# Patient Record
Sex: FEMALE | Race: OTHER | NOT HISPANIC OR LATINO | URBAN - METROPOLITAN AREA
[De-identification: names, ages, dates, MRNs, and addresses within clinical notes are randomized per-mention and may not be internally consistent; named-entity substitution may affect disease eponyms.]

---

## 2018-09-17 ENCOUNTER — EMERGENCY (EMERGENCY)
Facility: HOSPITAL | Age: 38
LOS: 1 days | Discharge: HOME | End: 2018-09-17
Attending: EMERGENCY MEDICINE
Payer: SELF-PAY

## 2018-09-17 ENCOUNTER — OUTPATIENT (OUTPATIENT)
Dept: OUTPATIENT SERVICES | Facility: HOSPITAL | Age: 38
LOS: 1 days | End: 2018-09-17
Payer: SELF-PAY

## 2018-09-17 VITALS
RESPIRATION RATE: 18 BRPM | HEART RATE: 94 BPM | DIASTOLIC BLOOD PRESSURE: 66 MMHG | SYSTOLIC BLOOD PRESSURE: 102 MMHG | OXYGEN SATURATION: 100 % | TEMPERATURE: 97 F

## 2018-09-17 VITALS — SYSTOLIC BLOOD PRESSURE: 107 MMHG | HEART RATE: 79 BPM | DIASTOLIC BLOOD PRESSURE: 70 MMHG

## 2018-09-17 VITALS — WEIGHT: 132.94 LBS

## 2018-09-17 DIAGNOSIS — O47.02 FALSE LABOR BEFORE 37 COMPLETED WEEKS OF GESTATION, SECOND TRIMESTER: ICD-10-CM

## 2018-09-17 LAB
APPEARANCE UR: CLEAR — SIGNIFICANT CHANGE UP
BILIRUB UR-MCNC: NEGATIVE — SIGNIFICANT CHANGE UP
COLOR SPEC: YELLOW — SIGNIFICANT CHANGE UP
DIFF PNL FLD: NEGATIVE — SIGNIFICANT CHANGE UP
EPI CELLS # UR: SIGNIFICANT CHANGE UP
GLUCOSE UR QL: NEGATIVE MG/DL — SIGNIFICANT CHANGE UP
HCG UR QL: POSITIVE
KETONES UR-MCNC: NEGATIVE — SIGNIFICANT CHANGE UP
LEUKOCYTE ESTERASE UR-ACNC: ABNORMAL
NITRITE UR-MCNC: NEGATIVE — SIGNIFICANT CHANGE UP
PH UR: 8 — SIGNIFICANT CHANGE UP (ref 5–8)
PROT UR-MCNC: 15 MG/DL
SP GR SPEC: 1.01 — SIGNIFICANT CHANGE UP (ref 1.01–1.02)
UROBILINOGEN FLD QL: 1 MG/DL
WBC UR QL: SIGNIFICANT CHANGE UP

## 2018-09-17 PROCEDURE — 99244 OFF/OP CNSLTJ NEW/EST MOD 40: CPT

## 2018-09-17 PROCEDURE — G0463: CPT

## 2018-09-17 PROCEDURE — 99284 EMERGENCY DEPT VISIT MOD MDM: CPT

## 2018-09-17 PROCEDURE — 76805 OB US >/= 14 WKS SNGL FETUS: CPT

## 2018-09-17 PROCEDURE — 81001 URINALYSIS AUTO W/SCOPE: CPT

## 2018-09-17 PROCEDURE — 59025 FETAL NON-STRESS TEST: CPT

## 2018-09-17 PROCEDURE — 99284 EMERGENCY DEPT VISIT MOD MDM: CPT | Mod: 25

## 2018-09-17 PROCEDURE — 76805 OB US >/= 14 WKS SNGL FETUS: CPT | Mod: 26

## 2018-09-17 PROCEDURE — 81025 URINE PREGNANCY TEST: CPT

## 2018-09-17 RX ORDER — SODIUM CHLORIDE 9 MG/ML
1000 INJECTION INTRAMUSCULAR; INTRAVENOUS; SUBCUTANEOUS ONCE
Qty: 0 | Refills: 0 | Status: DISCONTINUED | OUTPATIENT
Start: 2018-09-17 | End: 2018-09-17

## 2018-09-17 NOTE — ED ADULT NURSE REASSESSMENT NOTE - NS ED NURSE REASSESS COMMENT FT1
per pa pt 5months pregnant to go directly to [facility], pt presently in [facility], [facility] notified for pt transport to [facility], [facility]n made aware by pa

## 2018-09-17 NOTE — ED ADULT TRIAGE NOTE - CHIEF COMPLAINT QUOTE
Pt brought in by family for nausea and headache. Pt states she is 5 months pregnant but has not see an OB/GYN .

## 2018-09-17 NOTE — ED STATDOCS - OBJECTIVE STATEMENT
39 y/o F pt 5 months pregnant presents to the ED c/o sudden onset nausea and a headache a few days ago.  She ate breakfast this morning and was able to hold the food down without vomiting.  She has not followed up with a OB/GYN yet.  Pt is from [facility], and does not speak english.  Hx obtained with the help of her mother.  Denies fever, chills, vomiting, abdominal pain, urinary symptoms, vaginal bleeding.  No further acute complaints at this time.

## 2018-09-17 NOTE — ED ADULT NURSE NOTE - NSIMPLEMENTINTERV_GEN_ALL_ED
Implemented All Universal Safety Interventions:  [place] to call system. Call bell, personal items and telephone within reach. Instruct patient to call for assistance. Room bathroom lighting operational. Non-slip footwear when patient is off stretcher. Physically safe environment: no spills, clutter or unnecessary equipment. Stretcher in lowest position, wheels locked, appropriate side rails in place.

## 2018-09-17 NOTE — ED STATDOCS - PROGRESS NOTE DETAILS
PT evaluated by intake physician. HPI/PE/ROS as noted above. Will follow up plan per intake physician. Pt urine reviewed. PT to be transferred to L&D for evaluation. Pt urine reviewed. Spoke with Jocelin Taylor PBGYN resident. Awaiting Taulia states Pt is 22 weeks, will transfer to L&D.

## 2018-09-17 NOTE — ED STATDOCS - ATTENDING CONTRIBUTION TO CARE
I, Kimo Malagon, performed the initial face to face bedside interview with this patient regarding history of present illness, review of symptoms and relevant past medical, social and family history.  I completed an independent physical examination.  I was the initial provider who evaluated this patient. I have signed out the follow up of any pending tests (i.e. labs, radiological studies) to the ACP.  I have communicated the patient’s plan of care and disposition with the ACP.  The history, relevant review of systems, past medical and surgical history, medical decision making, and physical examination was documented by the scribe in my presence and I attest to the accuracy of the documentation.

## 2018-09-19 PROBLEM — Z00.00 ENCOUNTER FOR PREVENTIVE HEALTH EXAMINATION: Status: ACTIVE | Noted: 2018-09-19

## 2018-09-21 ENCOUNTER — APPOINTMENT (OUTPATIENT)
Dept: OBGYN | Facility: CLINIC | Age: 38
End: 2018-09-21

## 2018-11-29 ENCOUNTER — OUTPATIENT (OUTPATIENT)
Dept: INPATIENT UNIT | Facility: HOSPITAL | Age: 38
LOS: 1 days | End: 2018-11-29
Payer: MEDICAID

## 2018-11-29 VITALS — HEART RATE: 99 BPM | SYSTOLIC BLOOD PRESSURE: 119 MMHG | DIASTOLIC BLOOD PRESSURE: 79 MMHG

## 2018-11-29 DIAGNOSIS — O47.03 FALSE LABOR BEFORE 37 COMPLETED WEEKS OF GESTATION, THIRD TRIMESTER: ICD-10-CM

## 2018-11-29 PROCEDURE — 99213 OFFICE O/P EST LOW 20 MIN: CPT | Mod: GC

## 2018-11-29 PROCEDURE — 76818 FETAL BIOPHYS PROFILE W/NST: CPT | Mod: 26,GC

## 2018-11-29 PROCEDURE — G0463: CPT

## 2018-11-29 PROCEDURE — 76815 OB US LIMITED FETUS(S): CPT

## 2018-11-29 PROCEDURE — 59025 FETAL NON-STRESS TEST: CPT

## 2018-11-29 NOTE — OB PROVIDER TRIAGE NOTE - NSOBPROVIDERNOTE_OBGYN_ALL_OB_FT
has 10/10 bedside BPP supervised by Dr. Carreno. Pt given information for [facility] clinic since she stated that she plans on delivering in the [place] and they need to establish care in this country.     J Luis has 10/10 bedside BPP supervised by Dr. Carreno. Pt given information for [facility] clinic since she stated that she plans on delivering in the  and they need to establish care in this country.     J Luis    OB/GYN hospitalistL  Patient seen with PGY-2 for complaints of decreased FM. Since arrival on L&D, patient reports +FM.  Patient has received prenatal care in [facility], and is planning to establish care in [facility].  NST/BPP 10/10, DELIA 15  Fetal kick counts reviewed with patient.  Gretchen Carreno MD

## 2018-11-29 NOTE — OB PROVIDER TRIAGE NOTE - HISTORY OF PRESENT ILLNESS
37 yo  here at 31w 2 d  for decreased fetal movement. She reports that she normally feels baby moving regularly. She denies ctx and LOF.      She has received prenatal care in [facility] and brought her prenatal records with her     145 mod keith/ + accels/ no decels

## 2018-11-29 NOTE — OB RN TRIAGE NOTE - FAMILY HISTORY
Family history of colon cancer in father     Father  Still living? No  Family history of hypertension in father, Age at diagnosis: Age Unknown     Mother  Still living? Yes, Estimated age: Age Unknown  Family history of hypertension in mother, Age at diagnosis: Age Unknown

## 2019-01-10 ENCOUNTER — OUTPATIENT (OUTPATIENT)
Dept: OUTPATIENT SERVICES | Facility: HOSPITAL | Age: 39
LOS: 1 days | End: 2019-01-10
Payer: MEDICAID

## 2019-01-10 VITALS
HEART RATE: 76 BPM | RESPIRATION RATE: 18 BRPM | DIASTOLIC BLOOD PRESSURE: 62 MMHG | SYSTOLIC BLOOD PRESSURE: 110 MMHG | TEMPERATURE: 98 F

## 2019-01-10 VITALS — WEIGHT: 108.03 LBS | HEIGHT: 63 IN

## 2019-01-10 DIAGNOSIS — Z01.818 ENCOUNTER FOR OTHER PREPROCEDURAL EXAMINATION: ICD-10-CM

## 2019-01-10 DIAGNOSIS — N89.8 OTHER SPECIFIED NONINFLAMMATORY DISORDERS OF VAGINA: Chronic | ICD-10-CM

## 2019-01-10 LAB
BASOPHILS # BLD AUTO: 0 K/UL — SIGNIFICANT CHANGE UP (ref 0–0.2)
BASOPHILS NFR BLD AUTO: 0.2 % — SIGNIFICANT CHANGE UP (ref 0–2)
BLD GP AB SCN SERPL QL: SIGNIFICANT CHANGE UP
DACRYOCYTES BLD QL SMEAR: SLIGHT — SIGNIFICANT CHANGE UP
EOSINOPHIL # BLD AUTO: 0 K/UL — SIGNIFICANT CHANGE UP (ref 0–0.5)
EOSINOPHIL NFR BLD AUTO: 0.9 % — SIGNIFICANT CHANGE UP (ref 0–6)
HCT VFR BLD CALC: 33.5 % — LOW (ref 37–47)
HGB BLD-MCNC: 10.2 G/DL — LOW (ref 12–16)
HYPOCHROMIA BLD QL: SLIGHT — SIGNIFICANT CHANGE UP
LYMPHOCYTES # BLD AUTO: 1.4 K/UL — SIGNIFICANT CHANGE UP (ref 1–4.8)
LYMPHOCYTES # BLD AUTO: 24.1 % — SIGNIFICANT CHANGE UP (ref 20–55)
MCHC RBC-ENTMCNC: 21.9 PG — LOW (ref 27–31)
MCHC RBC-ENTMCNC: 30.4 G/DL — LOW (ref 32–36)
MCV RBC AUTO: 71.9 FL — LOW (ref 81–99)
MICROCYTES BLD QL: SLIGHT — SIGNIFICANT CHANGE UP
MONOCYTES # BLD AUTO: 0.7 K/UL — SIGNIFICANT CHANGE UP (ref 0–0.8)
MONOCYTES NFR BLD AUTO: 12.4 % — HIGH (ref 3–10)
NEUTROPHILS # BLD AUTO: 3.6 K/UL — SIGNIFICANT CHANGE UP (ref 1.8–8)
NEUTROPHILS NFR BLD AUTO: 62.2 % — SIGNIFICANT CHANGE UP (ref 37–73)
PLAT MORPH BLD: NORMAL — SIGNIFICANT CHANGE UP
PLATELET # BLD AUTO: 299 K/UL — SIGNIFICANT CHANGE UP (ref 150–400)
POLYCHROMASIA BLD QL SMEAR: SLIGHT — SIGNIFICANT CHANGE UP
RBC # BLD: 4.66 M/UL — SIGNIFICANT CHANGE UP (ref 4.4–5.2)
RBC # FLD: 18.2 % — HIGH (ref 11–15.6)
RBC BLD AUTO: ABNORMAL
TYPE + AB SCN PNL BLD: SIGNIFICANT CHANGE UP
WBC # BLD: 5.8 K/UL — SIGNIFICANT CHANGE UP (ref 4.8–10.8)
WBC # FLD AUTO: 5.8 K/UL — SIGNIFICANT CHANGE UP (ref 4.8–10.8)

## 2019-01-10 PROCEDURE — 86901 BLOOD TYPING SEROLOGIC RH(D): CPT

## 2019-01-10 PROCEDURE — 86850 RBC ANTIBODY SCREEN: CPT

## 2019-01-10 PROCEDURE — 85027 COMPLETE CBC AUTOMATED: CPT

## 2019-01-10 PROCEDURE — 86900 BLOOD TYPING SEROLOGIC ABO: CPT

## 2019-01-10 PROCEDURE — 36415 COLL VENOUS BLD VENIPUNCTURE: CPT

## 2019-01-10 PROCEDURE — G0463: CPT

## 2019-01-11 ENCOUNTER — TRANSCRIPTION ENCOUNTER (OUTPATIENT)
Age: 39
End: 2019-01-11

## 2019-01-11 DIAGNOSIS — O34.219 MATERNAL CARE FOR UNSPECIFIED TYPE SCAR FROM PREVIOUS CESAREAN DELIVERY: ICD-10-CM

## 2019-01-12 ENCOUNTER — INPATIENT (INPATIENT)
Facility: HOSPITAL | Age: 39
LOS: 2 days | Discharge: HOME | End: 2019-01-15
Attending: OBSTETRICS & GYNECOLOGY | Admitting: OBSTETRICS & GYNECOLOGY
Payer: MEDICAID

## 2019-01-12 ENCOUNTER — TRANSCRIPTION ENCOUNTER (OUTPATIENT)
Age: 39
End: 2019-01-12

## 2019-01-12 ENCOUNTER — RESULT REVIEW (OUTPATIENT)
Age: 39
End: 2019-01-12

## 2019-01-12 VITALS — HEART RATE: 96 BPM | DIASTOLIC BLOOD PRESSURE: 65 MMHG | SYSTOLIC BLOOD PRESSURE: 121 MMHG

## 2019-01-12 DIAGNOSIS — N89.8 OTHER SPECIFIED NONINFLAMMATORY DISORDERS OF VAGINA: Chronic | ICD-10-CM

## 2019-01-12 DIAGNOSIS — O47.1 FALSE LABOR AT OR AFTER 37 COMPLETED WEEKS OF GESTATION: ICD-10-CM

## 2019-01-12 LAB
ANISOCYTOSIS BLD QL: SLIGHT — SIGNIFICANT CHANGE UP
BASOPHILS # BLD AUTO: 0 K/UL — SIGNIFICANT CHANGE UP (ref 0–0.2)
BASOPHILS NFR BLD AUTO: 0.1 % — SIGNIFICANT CHANGE UP (ref 0–2)
BLD GP AB SCN SERPL QL: SIGNIFICANT CHANGE UP
EOSINOPHIL # BLD AUTO: 0 K/UL — SIGNIFICANT CHANGE UP (ref 0–0.5)
EOSINOPHIL # BLD AUTO: 0.1 K/UL — SIGNIFICANT CHANGE UP (ref 0–0.5)
EOSINOPHIL NFR BLD AUTO: 0 % — SIGNIFICANT CHANGE UP (ref 0–6)
EOSINOPHIL NFR BLD AUTO: 1.1 % — SIGNIFICANT CHANGE UP (ref 0–6)
HCT VFR BLD CALC: 31.5 % — LOW (ref 37–47)
HCT VFR BLD CALC: 31.8 % — LOW (ref 37–47)
HGB BLD-MCNC: 9.6 G/DL — LOW (ref 12–16)
HGB BLD-MCNC: 9.8 G/DL — LOW (ref 12–16)
HIV 1 & 2 AB SERPL IA.RAPID: SIGNIFICANT CHANGE UP
LYMPHOCYTES # BLD AUTO: 0.7 K/UL — LOW (ref 1–4.8)
LYMPHOCYTES # BLD AUTO: 1.9 K/UL — SIGNIFICANT CHANGE UP (ref 1–4.8)
LYMPHOCYTES # BLD AUTO: 24.2 % — SIGNIFICANT CHANGE UP (ref 20–55)
LYMPHOCYTES # BLD AUTO: 5.3 % — LOW (ref 20–55)
MCHC RBC-ENTMCNC: 21.7 PG — LOW (ref 27–31)
MCHC RBC-ENTMCNC: 22.1 PG — LOW (ref 27–31)
MCHC RBC-ENTMCNC: 30.5 G/DL — LOW (ref 32–36)
MCHC RBC-ENTMCNC: 30.8 G/DL — LOW (ref 32–36)
MCV RBC AUTO: 71.3 FL — LOW (ref 81–99)
MCV RBC AUTO: 71.6 FL — LOW (ref 81–99)
MICROCYTES BLD QL: SLIGHT — SIGNIFICANT CHANGE UP
MONOCYTES # BLD AUTO: 1 K/UL — HIGH (ref 0–0.8)
MONOCYTES # BLD AUTO: 1.3 K/UL — HIGH (ref 0–0.8)
MONOCYTES NFR BLD AUTO: 15.9 % — HIGH (ref 3–10)
MONOCYTES NFR BLD AUTO: 6.8 % — SIGNIFICANT CHANGE UP (ref 3–10)
NEUTROPHILS # BLD AUTO: 12.2 K/UL — HIGH (ref 1.8–8)
NEUTROPHILS # BLD AUTO: 4.7 K/UL — SIGNIFICANT CHANGE UP (ref 1.8–8)
NEUTROPHILS NFR BLD AUTO: 58.4 % — SIGNIFICANT CHANGE UP (ref 37–73)
NEUTROPHILS NFR BLD AUTO: 87.5 % — HIGH (ref 37–73)
OVALOCYTES BLD QL SMEAR: SLIGHT — SIGNIFICANT CHANGE UP
PLAT MORPH BLD: NORMAL — SIGNIFICANT CHANGE UP
PLATELET # BLD AUTO: 244 K/UL — SIGNIFICANT CHANGE UP (ref 150–400)
PLATELET # BLD AUTO: 263 K/UL — SIGNIFICANT CHANGE UP (ref 150–400)
POIKILOCYTOSIS BLD QL AUTO: SLIGHT — SIGNIFICANT CHANGE UP
RBC # BLD: 4.42 M/UL — SIGNIFICANT CHANGE UP (ref 4.4–5.2)
RBC # BLD: 4.44 M/UL — SIGNIFICANT CHANGE UP (ref 4.4–5.2)
RBC # FLD: 18 % — HIGH (ref 11–15.6)
RBC # FLD: 18.2 % — HIGH (ref 11–15.6)
RBC BLD AUTO: ABNORMAL
TYPE + AB SCN PNL BLD: SIGNIFICANT CHANGE UP
WBC # BLD: 13.9 K/UL — HIGH (ref 4.8–10.8)
WBC # BLD: 8 K/UL — SIGNIFICANT CHANGE UP (ref 4.8–10.8)
WBC # FLD AUTO: 13.9 K/UL — HIGH (ref 4.8–10.8)
WBC # FLD AUTO: 8 K/UL — SIGNIFICANT CHANGE UP (ref 4.8–10.8)

## 2019-01-12 PROCEDURE — 88304 TISSUE EXAM BY PATHOLOGIST: CPT | Mod: 26

## 2019-01-12 RX ORDER — OXYTOCIN 10 UNIT/ML
41.67 VIAL (ML) INJECTION
Qty: 20 | Refills: 0 | Status: DISCONTINUED | OUTPATIENT
Start: 2019-01-12 | End: 2019-01-15

## 2019-01-12 RX ORDER — OXYTOCIN 10 UNIT/ML
333.33 VIAL (ML) INJECTION
Qty: 20 | Refills: 0 | Status: DISCONTINUED | OUTPATIENT
Start: 2019-01-12 | End: 2019-01-15

## 2019-01-12 RX ORDER — NALOXONE HYDROCHLORIDE 4 MG/.1ML
0.1 SPRAY NASAL
Qty: 0 | Refills: 0 | Status: DISCONTINUED | OUTPATIENT
Start: 2019-01-12 | End: 2019-01-15

## 2019-01-12 RX ORDER — FERROUS SULFATE 325(65) MG
325 TABLET ORAL DAILY
Qty: 0 | Refills: 0 | Status: DISCONTINUED | OUTPATIENT
Start: 2019-01-12 | End: 2019-01-15

## 2019-01-12 RX ORDER — OXYCODONE AND ACETAMINOPHEN 5; 325 MG/1; MG/1
2 TABLET ORAL EVERY 6 HOURS
Qty: 0 | Refills: 0 | Status: DISCONTINUED | OUTPATIENT
Start: 2019-01-12 | End: 2019-01-15

## 2019-01-12 RX ORDER — CEFAZOLIN SODIUM 1 G
2000 VIAL (EA) INJECTION ONCE
Qty: 0 | Refills: 0 | Status: COMPLETED | OUTPATIENT
Start: 2019-01-12 | End: 2019-01-12

## 2019-01-12 RX ORDER — ONDANSETRON 8 MG/1
4 TABLET, FILM COATED ORAL EVERY 6 HOURS
Qty: 0 | Refills: 0 | Status: COMPLETED | OUTPATIENT
Start: 2019-01-12 | End: 2019-01-12

## 2019-01-12 RX ORDER — CITRIC ACID/SODIUM CITRATE 300-500 MG
30 SOLUTION, ORAL ORAL ONCE
Qty: 0 | Refills: 0 | Status: COMPLETED | OUTPATIENT
Start: 2019-01-12 | End: 2019-01-12

## 2019-01-12 RX ORDER — OXYTOCIN 10 UNIT/ML
41.67 VIAL (ML) INJECTION
Qty: 20 | Refills: 0 | Status: DISCONTINUED | OUTPATIENT
Start: 2019-01-12 | End: 2019-01-12

## 2019-01-12 RX ORDER — METOCLOPRAMIDE HCL 10 MG
10 TABLET ORAL ONCE
Qty: 0 | Refills: 0 | Status: DISCONTINUED | OUTPATIENT
Start: 2019-01-12 | End: 2019-01-15

## 2019-01-12 RX ORDER — KETOROLAC TROMETHAMINE 30 MG/ML
30 SYRINGE (ML) INJECTION ONCE
Qty: 0 | Refills: 0 | Status: DISCONTINUED | OUTPATIENT
Start: 2019-01-12 | End: 2019-01-12

## 2019-01-12 RX ORDER — METOCLOPRAMIDE HCL 10 MG
10 TABLET ORAL EVERY 6 HOURS
Qty: 0 | Refills: 0 | Status: DISCONTINUED | OUTPATIENT
Start: 2019-01-12 | End: 2019-01-15

## 2019-01-12 RX ORDER — DOCUSATE SODIUM 100 MG
100 CAPSULE ORAL
Qty: 0 | Refills: 0 | Status: DISCONTINUED | OUTPATIENT
Start: 2019-01-12 | End: 2019-01-15

## 2019-01-12 RX ORDER — DIPHENHYDRAMINE HCL 50 MG
25 CAPSULE ORAL EVERY 6 HOURS
Qty: 0 | Refills: 0 | Status: DISCONTINUED | OUTPATIENT
Start: 2019-01-12 | End: 2019-01-15

## 2019-01-12 RX ORDER — ONDANSETRON 8 MG/1
4 TABLET, FILM COATED ORAL EVERY 6 HOURS
Qty: 0 | Refills: 0 | Status: DISCONTINUED | OUTPATIENT
Start: 2019-01-12 | End: 2019-01-15

## 2019-01-12 RX ORDER — SODIUM CHLORIDE 9 MG/ML
1000 INJECTION, SOLUTION INTRAVENOUS
Qty: 0 | Refills: 0 | Status: DISCONTINUED | OUTPATIENT
Start: 2019-01-12 | End: 2019-01-12

## 2019-01-12 RX ORDER — GLYCERIN ADULT
1 SUPPOSITORY, RECTAL RECTAL AT BEDTIME
Qty: 0 | Refills: 0 | Status: DISCONTINUED | OUTPATIENT
Start: 2019-01-12 | End: 2019-01-15

## 2019-01-12 RX ORDER — ENOXAPARIN SODIUM 100 MG/ML
40 INJECTION SUBCUTANEOUS DAILY
Qty: 0 | Refills: 0 | Status: DISCONTINUED | OUTPATIENT
Start: 2019-01-12 | End: 2019-01-15

## 2019-01-12 RX ORDER — NALBUPHINE HYDROCHLORIDE 10 MG/ML
2.5 INJECTION, SOLUTION INTRAMUSCULAR; INTRAVENOUS; SUBCUTANEOUS EVERY 6 HOURS
Qty: 0 | Refills: 0 | Status: DISCONTINUED | OUTPATIENT
Start: 2019-01-12 | End: 2019-01-15

## 2019-01-12 RX ORDER — METOCLOPRAMIDE HCL 10 MG
10 TABLET ORAL EVERY 6 HOURS
Qty: 0 | Refills: 0 | Status: DISCONTINUED | OUTPATIENT
Start: 2019-01-12 | End: 2019-01-12

## 2019-01-12 RX ORDER — SODIUM CHLORIDE 9 MG/ML
1000 INJECTION, SOLUTION INTRAVENOUS ONCE
Qty: 0 | Refills: 0 | Status: COMPLETED | OUTPATIENT
Start: 2019-01-12 | End: 2019-01-12

## 2019-01-12 RX ORDER — DIPHENHYDRAMINE HCL 50 MG
25 CAPSULE ORAL EVERY 4 HOURS
Qty: 0 | Refills: 0 | Status: DISCONTINUED | OUTPATIENT
Start: 2019-01-12 | End: 2019-01-15

## 2019-01-12 RX ORDER — TETANUS TOXOID, REDUCED DIPHTHERIA TOXOID AND ACELLULAR PERTUSSIS VACCINE, ADSORBED 5; 2.5; 8; 8; 2.5 [IU]/.5ML; [IU]/.5ML; UG/.5ML; UG/.5ML; UG/.5ML
0.5 SUSPENSION INTRAMUSCULAR ONCE
Qty: 0 | Refills: 0 | Status: DISCONTINUED | OUTPATIENT
Start: 2019-01-12 | End: 2019-01-15

## 2019-01-12 RX ORDER — SIMETHICONE 80 MG/1
80 TABLET, CHEWABLE ORAL EVERY 4 HOURS
Qty: 0 | Refills: 0 | Status: DISCONTINUED | OUTPATIENT
Start: 2019-01-12 | End: 2019-01-15

## 2019-01-12 RX ORDER — KETOROLAC TROMETHAMINE 30 MG/ML
30 SYRINGE (ML) INJECTION ONCE
Qty: 0 | Refills: 0 | Status: DISCONTINUED | OUTPATIENT
Start: 2019-01-12 | End: 2019-01-15

## 2019-01-12 RX ORDER — OXYCODONE AND ACETAMINOPHEN 5; 325 MG/1; MG/1
1 TABLET ORAL
Qty: 0 | Refills: 0 | Status: DISCONTINUED | OUTPATIENT
Start: 2019-01-12 | End: 2019-01-15

## 2019-01-12 RX ORDER — IBUPROFEN 200 MG
600 TABLET ORAL EVERY 6 HOURS
Qty: 0 | Refills: 0 | Status: DISCONTINUED | OUTPATIENT
Start: 2019-01-12 | End: 2019-01-15

## 2019-01-12 RX ORDER — LANOLIN
1 OINTMENT (GRAM) TOPICAL
Qty: 0 | Refills: 0 | Status: DISCONTINUED | OUTPATIENT
Start: 2019-01-12 | End: 2019-01-15

## 2019-01-12 RX ORDER — METOCLOPRAMIDE HCL 10 MG
10 TABLET ORAL ONCE
Qty: 0 | Refills: 0 | Status: DISCONTINUED | OUTPATIENT
Start: 2019-01-12 | End: 2019-01-12

## 2019-01-12 RX ORDER — SODIUM CHLORIDE 9 MG/ML
1000 INJECTION, SOLUTION INTRAVENOUS
Qty: 0 | Refills: 0 | Status: DISCONTINUED | OUTPATIENT
Start: 2019-01-12 | End: 2019-01-15

## 2019-01-12 RX ADMIN — Medication 100 MILLIGRAM(S): at 07:44

## 2019-01-12 RX ADMIN — Medication 1000 MILLIUNIT(S)/MIN: at 08:09

## 2019-01-12 RX ADMIN — SODIUM CHLORIDE 2000 MILLILITER(S): 9 INJECTION, SOLUTION INTRAVENOUS at 02:45

## 2019-01-12 RX ADMIN — ONDANSETRON 4 MILLIGRAM(S): 8 TABLET, FILM COATED ORAL at 09:24

## 2019-01-12 RX ADMIN — Medication 30 MILLILITER(S): at 06:24

## 2019-01-12 RX ADMIN — ENOXAPARIN SODIUM 40 MILLIGRAM(S): 100 INJECTION SUBCUTANEOUS at 21:55

## 2019-01-12 NOTE — DISCHARGE NOTE OB - CARE PLAN
Principal Discharge DX:	 delivery delivered  Goal:	Healthy mother and baby  Assessment and plan of treatment:	- Recommended early ambulation, adequate hydration and a balanced diet. Mother-baby interaction also encouraged and breastfeeding.  -Pelvic rest x 6 weeks  -Wound care in 4 days at [facility]  -Postpartum check in 4-6 weeks at [facility] for postpartum care.

## 2019-01-12 NOTE — OB PROVIDER H&P - ASSESSMENT
38y  at 38.1 weeks by LMP presenting with chief complain of contractions, admitted for .    Plan:  -  Admit to L&D  - Routine Labs  - GBS Neg  - Prepare for c section  - c/w monitoring  - Ancef 2g IV prior to Csection    Briana Crawford MD PGY-1    Discussed with: Dr. Finnegan. 38y  at 38.1 weeks , prior  declining tolac, labor, cat 2 - admitted for      Plan:  -  Admit to L&D  - Routine Labs  - GBS Neg  - Prepare for c section  - c/w monitoring  - Ancef 2g IV prior to Csection    Briana Crawford MD PGY-1    Discussed with: Dr. Finnegan.

## 2019-01-12 NOTE — DISCHARGE NOTE OB - PROVIDER TOKENS
FREE:[LAST:[[facility]],PHONE:[phone]],FAX:[(   [phone]    -],ADDRESS:[[address] [place] [address]]]

## 2019-01-12 NOTE — DISCHARGE NOTE OB - MEDICATION SUMMARY - MEDICATIONS TO STOP TAKING
I will STOP taking the medications listed below when I get home from the hospital:    Prenatal 1 oral capsule  -- 1 tab(s) by mouth once a day

## 2019-01-12 NOTE — OB PROVIDER DELIVERY SUMMARY - NSPROVIDERDELIVERYNOTE_OBGYN_ALL_OB_FT
repeat  at 38wks  indication: early labor, cat 2 tracing, declines TOLAC  consented  spinal  ancef  pfanensteil    findings: no adhesions, normal uterus, ovaries and tubes    low transverse uterine incision  atraumatic delivery of fetus and placenta  Uterus closed in 2 layers  abdomen and skin closed    Urine 100      baby boy at 0809, 3780g,

## 2019-01-12 NOTE — OB RN PREOPERATIVE CHECKLIST - NS_PREOPMEDADMIN_OBGYN_ALL_OB
Ventricular Rate : 71   Atrial Rate : 71   P-R Interval : 194   QRS Duration : 88   Q-T Interval : 388   QTC Calculation(Bezet) : 421   P Axis : 40   R Axis : 23   T Axis : 36   Diagnosis : Sinus rhythm with occasional Premature ventricular complexes~Otherwise normal ECG~When compared with ECG of 05-MAY-2018 11:49,~Premature ventricular complexes are now Present~Confirmed by JOSE GUADALUPE MAITRAYEE ([id number]) on 7/5/2018 9:51:06 AM     
Yes, see eMAR

## 2019-01-12 NOTE — DISCHARGE NOTE OB - HOSPITAL COURSE
Patient is a 38 yYO female  who experienced repeat  section at 38 1/7 weeks for labor at term. Labor course was complicated by abnormal fetal heart tracing, delivery was uncomplicated. Postpartum course was unremarkable. Patient was transferred to postpartum floor and monitored. Patient is medically optimized for discharge and is instructed to follow up at [facility] in 4 days for staple/wound check and in 4-6 weeks for routine postpartum care. Patient verbalizes understanding and agreement with treatment and follow up plan and is satisfied with her care. At the time of discharge, patient was ambulating independently, tolerating PO intake, voiding and stooling appropriately, passing flatus and pain is well controlled.

## 2019-01-12 NOTE — OB PROVIDER H&P - NSHPPHYSICALEXAM_GEN_ALL_CORE
T(C): 36.8 (01-12-19 @ 04:32), Max: 36.8 (01-12-19 @ 04:32)  HR: 96 (01-12-19 @ 04:32) (96 - 96)  BP: 121/65 (01-12-19 @ 04:32) (121/65 - 121/65)  RR: 16 (01-12-19 @ 04:32) (16 - 16)    Physical Exam:    -Gen: Not in acute distress. Seating in bed, Cooperative.  - CV: S1-S2 present, no murmurs/rubs/gallops  -Respiratory: Lungs are clear to auscultation.  - Abdomen: Gravid, no tenderness on palpation, go guarding.   - Pelvic exam: cervix is  long, posterior, closed.

## 2019-01-12 NOTE — DISCHARGE NOTE OB - ADDITIONAL INSTRUCTIONS
Please follow up with youe OB/GYN at [facility] care in 4 days for wound care and 6 weeks for routine postpartum care.

## 2019-01-12 NOTE — DISCHARGE NOTE OB - PLAN OF CARE
Healthy mother and baby - Recommended early ambulation, adequate hydration and a balanced diet. Mother-baby interaction also encouraged and breastfeeding.  -Pelvic rest x 6 weeks  -Wound care in 4 days at [facility]  -Postpartum check in 4-6 weeks at [facility] for postpartum care.

## 2019-01-12 NOTE — DISCHARGE NOTE OB - MATERIALS PROVIDED
Mom would like for Naproxen sent to the pharmacy on file now. Last prescription sent to the wrong pharmacy.    Thank you.   [facility] Hearing Screen Program/Tdap Vaccination (VIS Pub Date: 2012)/Breastfeeding Guide and Packet/Breastfeeding Mother’s Support Group Information/Guide to Postpartum Care/Birth Certificate Instructions/Back To Sleep Handout/Shaken Baby Prevention Handout/[facility] [place] Screening Program/Breastfeeding Log

## 2019-01-12 NOTE — OB NEONATOLOGY/PEDIATRICIAN DELIVERY SUMMARY - NSPEDSNEONOTESA_OBGYN_ALL_OB_FT
Called to OR #1 by Lisa Aviles MD to attend repeat C/S in labor @ 38 1/7 weeks.  Mother is a 38 year old   O negative, serology NR, HBsAg negative, HIV negative, GBS negative, Rubella immune mother with EDC 2019.  AROM @ C/S with clear amniotic fluid.  No known allergies.  Family history, social history all unremarkable.  Upon delivery CAN x 2 @ 2019 @ 0808 hours infant active, alert and responsive. Placed under radiant warmer, dried, positioned and suctioned with bulb syringe from mouth and nose.  Apgar score , 9  and 9 at 1and 5 respectively.  Transferred to regular nursery for care with Lisseth Riggs MD   Bwt:  3780 g.  Female

## 2019-01-12 NOTE — DISCHARGE NOTE OB - PATIENT PORTAL LINK FT
You can access the [facility] Patient Portal, offered by [facility], by registering with the following website: [email or web address]

## 2019-01-12 NOTE — OB PROVIDER H&P - ATTENDING COMMENTS
38y  at 38.1 weeks , prior  declining tolac, labor, cat 2 - admitted for repeat    informed consent obtained

## 2019-01-12 NOTE — DISCHARGE NOTE OB - MEDICATION SUMMARY - MEDICATIONS TO TAKE
I will START or STAY ON the medications listed below when I get home from the hospital:    ibuprofen 600 mg oral tablet  -- 1 tab(s) by mouth every 6 hours, As needed, Mild Pain (1 - 3)  -- Indication: For Pain    ferrous sulfate 325 mg (65 mg elemental iron) oral tablet  -- 1 tab(s) by mouth 3 times a day  -- Indication: For anemia    docusate sodium 100 mg oral capsule  -- 1 cap(s) by mouth 2 times a day, As needed, Stool Softening  -- Indication: For stool softener

## 2019-01-12 NOTE — OB PROVIDER H&P - HISTORY OF PRESENT ILLNESS
38y  at 38.1 weeks by LMP presenting with chief complain of contractions.  Patient states that since 11 pm she has been presenting intermittent episodes of back pain and lower abdomen pain, increasing in intensity and frequency, associated with 1 episode of vomiting. Patient endorses fetal movements but states the fetus has been moving less frequency compared to baseline.  Patient is a late registrant for prenatal care in the [place] with partial documentation from [facility], as per [facility] records.     Patient denies vaginal bleeding, LOF, chest pain, sick contacts, allergies to medication, diarrhea, dysuria, hematuria.

## 2019-01-13 LAB
HIV 1+2 AB+HIV1 P24 AG SERPL QL IA: SIGNIFICANT CHANGE UP
T PALLIDUM AB TITR SER: NEGATIVE — SIGNIFICANT CHANGE UP

## 2019-01-13 RX ADMIN — OXYCODONE AND ACETAMINOPHEN 2 TABLET(S): 5; 325 TABLET ORAL at 09:00

## 2019-01-13 RX ADMIN — Medication 600 MILLIGRAM(S): at 21:38

## 2019-01-13 RX ADMIN — SIMETHICONE 80 MILLIGRAM(S): 80 TABLET, CHEWABLE ORAL at 21:08

## 2019-01-13 RX ADMIN — OXYCODONE AND ACETAMINOPHEN 2 TABLET(S): 5; 325 TABLET ORAL at 17:00

## 2019-01-13 RX ADMIN — Medication 600 MILLIGRAM(S): at 21:08

## 2019-01-13 RX ADMIN — OXYCODONE AND ACETAMINOPHEN 2 TABLET(S): 5; 325 TABLET ORAL at 08:13

## 2019-01-13 RX ADMIN — OXYCODONE AND ACETAMINOPHEN 2 TABLET(S): 5; 325 TABLET ORAL at 16:17

## 2019-01-13 RX ADMIN — ENOXAPARIN SODIUM 40 MILLIGRAM(S): 100 INJECTION SUBCUTANEOUS at 21:10

## 2019-01-13 NOTE — PROGRESS NOTE ADULT - SUBJECTIVE AND OBJECTIVE BOX
INTERVAL HPI/OVERNIGHT EVENTS:  38y Female s/p c section under spinal anesthesia with duramorph for post op analgesia on 1/12/19    Vital Signs Last 24 Hrs  T(C): 36.9 (13 Jan 2019 08:56), Max: 37.1 (13 Jan 2019 00:26)  T(F): 98.5 (13 Jan 2019 08:56), Max: 98.7 (13 Jan 2019 00:26)  HR: 89 (13 Jan 2019 08:56) (78 - 89)  BP: 90/54 (13 Jan 2019 08:56) (90/54 - 108/62)  BP(mean): --  RR: 18 (13 Jan 2019 08:56) (18 - 18)  SpO2: 97% (12 Jan 2019 20:15) (97% - 97%)    Patient seen, doing well, no anesthetic complications or complaints noted or reported.  Pain is controlled.

## 2019-01-13 NOTE — PROGRESS NOTE ADULT - SUBJECTIVE AND OBJECTIVE BOX
Postpartum progress note.  38 years old.  s/p rCS at 38w1d gestation. Postop day # 1    Patient seen and examined at bedside, no acute overnight events.    Pain is well controlled.  Patient is ambulating, tolerating PO intake of regular diet, voiding, -flatus/-BM.   Bottle feeding only.  She reports mild vaginal bleeding.  Patient denies headache, nausea/vomiting, shortness of breath, fever, chills or calf pain.     Vital Signs: Vital Signs Last 24 Hrs  T(C): 37 (2019 05:19), Max: 37.1 (2019 00:26)  T(F): 98.6 (2019 05:19), Max: 98.7 (2019 00:26)  HR: 89 (2019 05:19) (73 - 100)  BP: 99/63 (2019 05:19) (91/56 - 123/77)  BP(mean): 73 (2019 10:05) (73 - 74)  RR: 18 (2019 05:19) (8 - 28)  SpO2: 97% (2019 20:15) (97% - 100%)    Physical Examination:  Gen: NAD, comfortable  Breast: no tenderness or engorgement  Abd: BS+, soft, non distended, minimally tender, fundus firm at level of umbilicus  Ext: No cyanosis or edema. No calf tenderness, (-) Josephine's sign    Labs:                        9.6    13.9  )-----------( 244      ( 2019 17:55 )             31.5       Medication:  MEDICATIONS  (STANDING):  diphtheria/tetanus/pertussis (acellular) Vaccine (ADAcel) 0.5 milliLiter(s) IntraMuscular once  enoxaparin Injectable 40 milliGRAM(s) SubCutaneous daily  ferrous    sulfate 325 milliGRAM(s) Oral daily  lactated ringers. 1000 milliLiter(s) (125 mL/Hr) IV Continuous <Continuous>  oxytocin Infusion 333.333 milliUNIT(s)/Min (1000 mL/Hr) IV Continuous <Continuous>  oxytocin Infusion 41.667 milliUNIT(s)/Min (125 mL/Hr) IV Continuous <Continuous>  oxytocin Infusion 41.667 milliUNIT(s)/Min (125 mL/Hr) IV Continuous <Continuous>  prenatal multivitamin 1 Tablet(s) Oral daily    MEDICATIONS  (PRN):  diphenhydrAMINE 25 milliGRAM(s) Oral every 6 hours PRN Itching  diphenhydrAMINE   Injectable 25 milliGRAM(s) IV Push every 4 hours PRN Pruritus  docusate sodium 100 milliGRAM(s) Oral two times a day PRN Stool Softening  glycerin Suppository - Adult 1 Suppository(s) Rectal at bedtime PRN Constipation  ibuprofen  Tablet. 600 milliGRAM(s) Oral every 6 hours PRN Mild Pain (1 - 3)  ketorolac   Injectable 30 milliGRAM(s) IV Push once PRN Moderate Pain (4 - 6)  lanolin Ointment 1 Application(s) Topical every 3 hours PRN Sore Nipples  metoclopramide Injectable 10 milliGRAM(s) IV Push every 6 hours PRN Postoperative Nausea and Vomiting  metoclopramide Injectable 10 milliGRAM(s) IV Push once PRN Nausea and/or Vomiting  nalbuphine Injectable 2.5 milliGRAM(s) IV Push every 6 hours PRN Pruritus  naloxone Injectable 0.1 milliGRAM(s) IV Push every 3 minutes PRN For ANY of the following changes in patient status:  A. RR LESS THAN 10 breaths per minute, B. Oxygen saturation LESS THAN 90%, C. Sedation score of 6  ondansetron Injectable 4 milliGRAM(s) IV Push every 6 hours PRN Nausea  oxyCODONE    5 mG/acetaminophen 325 mG 1 Tablet(s) Oral every 3 hours PRN Moderate Pain (4 - 6)  oxyCODONE    5 mG/acetaminophen 325 mG 2 Tablet(s) Oral every 6 hours PRN Severe Pain (7 - 10)  simethicone 80 milliGRAM(s) Chew every 4 hours PRN Gas Postpartum progress note.  38 years old.  s/p rCS at 38w1d gestation. Postop day # 1    Patient seen and examined at bedside, no acute overnight events.    Pain is well controlled.  Patient is ambulating, tolerating PO intake of regular diet, voiding, -flatus/-BM.   Bottle feeding only.  She reports mild vaginal bleeding.  Patient denies headache, nausea/vomiting, shortness of breath, fever, chills or calf pain.     Vital Signs: Vital Signs Last 24 Hrs  T(C): 37 (2019 05:19), Max: 37.1 (2019 00:26)  T(F): 98.6 (2019 05:19), Max: 98.7 (2019 00:26)  HR: 89 (2019 05:19) (73 - 100)  BP: 99/63 (2019 05:19) (91/56 - 123/77)  BP(mean): 73 (2019 10:05) (73 - 74)  RR: 18 (2019 05:19) (8 - 28)  SpO2: 97% (2019 20:15) (97% - 100%)    Physical Examination:  Gen: NAD, comfortable  Breast: no tenderness or engorgement  Abd: BS+, soft, non distended, minimally tender, fundus firm at level of umbilicus, incision site c/d/i with steri strips in place  Ext: No edema. No calf tenderness, DP pulses 2+    Labs:                        9.6    13.9  )-----------( 244      ( 2019 17:55 )             31.5       Medication:  MEDICATIONS  (STANDING):  diphtheria/tetanus/pertussis (acellular) Vaccine (ADAcel) 0.5 milliLiter(s) IntraMuscular once  enoxaparin Injectable 40 milliGRAM(s) SubCutaneous daily  ferrous    sulfate 325 milliGRAM(s) Oral daily  lactated ringers. 1000 milliLiter(s) (125 mL/Hr) IV Continuous <Continuous>  oxytocin Infusion 333.333 milliUNIT(s)/Min (1000 mL/Hr) IV Continuous <Continuous>  oxytocin Infusion 41.667 milliUNIT(s)/Min (125 mL/Hr) IV Continuous <Continuous>  oxytocin Infusion 41.667 milliUNIT(s)/Min (125 mL/Hr) IV Continuous <Continuous>  prenatal multivitamin 1 Tablet(s) Oral daily    MEDICATIONS  (PRN):  diphenhydrAMINE 25 milliGRAM(s) Oral every 6 hours PRN Itching  diphenhydrAMINE   Injectable 25 milliGRAM(s) IV Push every 4 hours PRN Pruritus  docusate sodium 100 milliGRAM(s) Oral two times a day PRN Stool Softening  glycerin Suppository - Adult 1 Suppository(s) Rectal at bedtime PRN Constipation  ibuprofen  Tablet. 600 milliGRAM(s) Oral every 6 hours PRN Mild Pain (1 - 3)  ketorolac   Injectable 30 milliGRAM(s) IV Push once PRN Moderate Pain (4 - 6)  lanolin Ointment 1 Application(s) Topical every 3 hours PRN Sore Nipples  metoclopramide Injectable 10 milliGRAM(s) IV Push every 6 hours PRN Postoperative Nausea and Vomiting  metoclopramide Injectable 10 milliGRAM(s) IV Push once PRN Nausea and/or Vomiting  nalbuphine Injectable 2.5 milliGRAM(s) IV Push every 6 hours PRN Pruritus  naloxone Injectable 0.1 milliGRAM(s) IV Push every 3 minutes PRN For ANY of the following changes in patient status:  A. RR LESS THAN 10 breaths per minute, B. Oxygen saturation LESS THAN 90%, C. Sedation score of 6  ondansetron Injectable 4 milliGRAM(s) IV Push every 6 hours PRN Nausea  oxyCODONE    5 mG/acetaminophen 325 mG 1 Tablet(s) Oral every 3 hours PRN Moderate Pain (4 - 6)  oxyCODONE    5 mG/acetaminophen 325 mG 2 Tablet(s) Oral every 6 hours PRN Severe Pain (7 - 10)  simethicone 80 milliGRAM(s) Chew every 4 hours PRN Gas

## 2019-01-14 RX ORDER — IBUPROFEN 200 MG
1 TABLET ORAL
Qty: 56 | Refills: 0 | OUTPATIENT
Start: 2019-01-14 | End: 2019-01-27

## 2019-01-14 RX ORDER — FERROUS SULFATE 325(65) MG
1 TABLET ORAL
Qty: 90 | Refills: 0 | OUTPATIENT
Start: 2019-01-14 | End: 2019-02-12

## 2019-01-14 RX ORDER — DOCUSATE SODIUM 100 MG
1 CAPSULE ORAL
Qty: 60 | Refills: 0 | OUTPATIENT
Start: 2019-01-14 | End: 2019-02-12

## 2019-01-14 RX ADMIN — ENOXAPARIN SODIUM 40 MILLIGRAM(S): 100 INJECTION SUBCUTANEOUS at 20:43

## 2019-01-14 RX ADMIN — OXYCODONE AND ACETAMINOPHEN 2 TABLET(S): 5; 325 TABLET ORAL at 17:20

## 2019-01-14 RX ADMIN — OXYCODONE AND ACETAMINOPHEN 2 TABLET(S): 5; 325 TABLET ORAL at 16:37

## 2019-01-14 RX ADMIN — OXYCODONE AND ACETAMINOPHEN 2 TABLET(S): 5; 325 TABLET ORAL at 08:40

## 2019-01-14 RX ADMIN — Medication 100 MILLIGRAM(S): at 20:46

## 2019-01-14 RX ADMIN — OXYCODONE AND ACETAMINOPHEN 2 TABLET(S): 5; 325 TABLET ORAL at 07:55

## 2019-01-14 RX ADMIN — Medication 600 MILLIGRAM(S): at 21:12

## 2019-01-14 RX ADMIN — Medication 600 MILLIGRAM(S): at 20:42

## 2019-01-14 NOTE — PROGRESS NOTE ADULT - SUBJECTIVE AND OBJECTIVE BOX
Postpartum ( Section) Progress Note  38y years old  status post repeat  at 38 wks gestation for prior  Post-Operative Day #2    Patient seen and examined at bedside, no acute overnight events.    Subjective: Patient is ambulating, tolerating PO intake of regular diet, voiding, passing flatus. No breast tenderness, breast feeding. Pain is well controlled. She reports normal postpartum bleeding. Patient denies headache, shortness of breath, fever, chills, nausea/vomiting or calf tenderness.     Objective:  Vital Signs: Vital Signs Last 24 Hrs  T(C): 37 (2019 19:48), Max: 37 (2019 19:48)  T(F): 98.6 (2019 19:48), Max: 98.6 (2019 19:48)  HR: 87 (2019 19:48) (87 - 89)  BP: 98/57 (2019 19:48) (90/54 - 98/57)  BP(mean): --  RR: 18 (2019 19:48) (18 - 18)  SpO2: --    Physical Examination:  General: No acute distress.  Lungs: Clear to auscultation bilaterally, no adventitious sounds.  Cardiovascular: Regular rate and rhythm, normal S1/S2, no murmurs.  Breast: No tenderness or engorgement, no abnormal discharge  Abdomen: Bowel sounds present, soft, non distended, minimally tender, firm uterine fundus at 1 cm below umbilicus. Incision site clean and healing well with steri-strips in place.  Pelvic: Normal lochia rubra noted  Extremities: No edema. No calf tenderness, (-) Josephine's sign.    Labs:                        9.6    13.9  )-----------( 244      ( 2019 17:55 )             31.5         Medication:  MEDICATIONS  (STANDING):  diphtheria/tetanus/pertussis (acellular) Vaccine (ADAcel) 0.5 milliLiter(s) IntraMuscular once  enoxaparin Injectable 40 milliGRAM(s) SubCutaneous daily  ferrous    sulfate 325 milliGRAM(s) Oral daily  lactated ringers. 1000 milliLiter(s) (125 mL/Hr) IV Continuous <Continuous>  oxytocin Infusion 333.333 milliUNIT(s)/Min (1000 mL/Hr) IV Continuous <Continuous>  oxytocin Infusion 41.667 milliUNIT(s)/Min (125 mL/Hr) IV Continuous <Continuous>  oxytocin Infusion 41.667 milliUNIT(s)/Min (125 mL/Hr) IV Continuous <Continuous>  prenatal multivitamin 1 Tablet(s) Oral daily    MEDICATIONS  (PRN):  diphenhydrAMINE 25 milliGRAM(s) Oral every 6 hours PRN Itching  diphenhydrAMINE   Injectable 25 milliGRAM(s) IV Push every 4 hours PRN Pruritus  docusate sodium 100 milliGRAM(s) Oral two times a day PRN Stool Softening  glycerin Suppository - Adult 1 Suppository(s) Rectal at bedtime PRN Constipation  ibuprofen  Tablet. 600 milliGRAM(s) Oral every 6 hours PRN Mild Pain (1 - 3)  ketorolac   Injectable 30 milliGRAM(s) IV Push once PRN Moderate Pain (4 - 6)  lanolin Ointment 1 Application(s) Topical every 3 hours PRN Sore Nipples  metoclopramide Injectable 10 milliGRAM(s) IV Push every 6 hours PRN Postoperative Nausea and Vomiting  metoclopramide Injectable 10 milliGRAM(s) IV Push once PRN Nausea and/or Vomiting  nalbuphine Injectable 2.5 milliGRAM(s) IV Push every 6 hours PRN Pruritus  naloxone Injectable 0.1 milliGRAM(s) IV Push every 3 minutes PRN For ANY of the following changes in patient status:  A. RR LESS THAN 10 breaths per minute, B. Oxygen saturation LESS THAN 90%, C. Sedation score of 6  ondansetron Injectable 4 milliGRAM(s) IV Push every 6 hours PRN Nausea  oxyCODONE    5 mG/acetaminophen 325 mG 1 Tablet(s) Oral every 3 hours PRN Moderate Pain (4 - 6)  oxyCODONE    5 mG/acetaminophen 325 mG 2 Tablet(s) Oral every 6 hours PRN Severe Pain (7 - 10)  simethicone 80 milliGRAM(s) Chew every 4 hours PRN Gas      Assessment:  38y years old  status post repeat  at 38 wks gestation for prior  Post-Operative Day #2 without complications.    Plan:  Routine post  care.  Encourage ambulation

## 2019-01-14 NOTE — PROGRESS NOTE ADULT - ATTENDING COMMENTS
patient is POD#3 status post c/section  patient with no complaints  incision healing well and breastfeeding without complications  will discharge home today with follow up in 2 weeks
I have spoken with and examined the patient  I agree with above assessment and plan  anticipate discharge home in the am

## 2019-01-15 VITALS
RESPIRATION RATE: 18 BRPM | DIASTOLIC BLOOD PRESSURE: 76 MMHG | SYSTOLIC BLOOD PRESSURE: 117 MMHG | HEART RATE: 76 BPM | TEMPERATURE: 98 F

## 2019-01-15 PROCEDURE — 85027 COMPLETE CBC AUTOMATED: CPT

## 2019-01-15 PROCEDURE — 59025 FETAL NON-STRESS TEST: CPT

## 2019-01-15 PROCEDURE — 86901 BLOOD TYPING SEROLOGIC RH(D): CPT

## 2019-01-15 PROCEDURE — G0463: CPT

## 2019-01-15 PROCEDURE — 59050 FETAL MONITOR W/REPORT: CPT

## 2019-01-15 PROCEDURE — 86703 HIV-1/HIV-2 1 RESULT ANTBDY: CPT

## 2019-01-15 PROCEDURE — 86780 TREPONEMA PALLIDUM: CPT

## 2019-01-15 PROCEDURE — 86900 BLOOD TYPING SEROLOGIC ABO: CPT

## 2019-01-15 PROCEDURE — 36415 COLL VENOUS BLD VENIPUNCTURE: CPT

## 2019-01-15 PROCEDURE — 88304 TISSUE EXAM BY PATHOLOGIST: CPT

## 2019-01-15 PROCEDURE — 87389 HIV-1 AG W/HIV-1&-2 AB AG IA: CPT

## 2019-01-15 PROCEDURE — 86850 RBC ANTIBODY SCREEN: CPT

## 2019-01-15 RX ADMIN — Medication 600 MILLIGRAM(S): at 06:00

## 2019-01-15 RX ADMIN — Medication 600 MILLIGRAM(S): at 06:30

## 2019-01-15 NOTE — PROGRESS NOTE ADULT - SUBJECTIVE AND OBJECTIVE BOX
Postpartum ( Section) Progress Note  38y years old  status post repeat  at 38 1/7 wks gestation for previous Post-Operative Day #3.    Patient seen and examined at bedside, no acute overnight events.    Subjective: Patient is ambulating, tolerating PO intake of regular diet, voiding, passing flatus although has not had a bowel movement since the delivery. No breast tenderness, breast feeding. Pain is well controlled. She reports normal postpartum bleeding.  Patient denies headache, shortness of breath, fever, chills, nausea/vomiting or calf tenderness.     Objective:  Vital Signs: Vital Signs Last 24 Hrs  T(C): 36.9 (2019 20:05), Max: 36.9 (2019 20:05)  T(F): 98.4 (2019 20:05), Max: 98.4 (2019 20:05)  HR: 80 (15 Quinten 2019 06:04) (80 - 87)  BP: 103/68 (15 Quinten 2019 06:04) (103/68 - 104/69)  BP(mean): --  RR: 18 (2019 20:05) (18 - 18)  SpO2: --    Physical Examination:  General: No acute distress.  Lungs: Clear to auscultation bilaterally, no adventitious sounds.  Cardiovascular: Regular rate and rhythm, normal S1/S2, no murmurs.  Breast: No tenderness or engorgement, no abnormal discharge  Abdomen: Bowel sounds present, soft, non distended, minimally tender, firm uterine fundus at 2 cm below umbilicus. Incision site clean and healing well, Steri-strips in place.  Pelvic: Normal lochia rubra noted  Extremities: No edema. No calf tenderness, (-) Josephine's sign.    Labs:        Medication:  MEDICATIONS  (STANDING):  diphtheria/tetanus/pertussis (acellular) Vaccine (ADAcel) 0.5 milliLiter(s) IntraMuscular once  enoxaparin Injectable 40 milliGRAM(s) SubCutaneous daily  ferrous    sulfate 325 milliGRAM(s) Oral daily  lactated ringers. 1000 milliLiter(s) (125 mL/Hr) IV Continuous <Continuous>  oxytocin Infusion 333.333 milliUNIT(s)/Min (1000 mL/Hr) IV Continuous <Continuous>  oxytocin Infusion 41.667 milliUNIT(s)/Min (125 mL/Hr) IV Continuous <Continuous>  oxytocin Infusion 41.667 milliUNIT(s)/Min (125 mL/Hr) IV Continuous <Continuous>  prenatal multivitamin 1 Tablet(s) Oral daily    MEDICATIONS  (PRN):  diphenhydrAMINE 25 milliGRAM(s) Oral every 6 hours PRN Itching  diphenhydrAMINE   Injectable 25 milliGRAM(s) IV Push every 4 hours PRN Pruritus  docusate sodium 100 milliGRAM(s) Oral two times a day PRN Stool Softening  glycerin Suppository - Adult 1 Suppository(s) Rectal at bedtime PRN Constipation  ibuprofen  Tablet. 600 milliGRAM(s) Oral every 6 hours PRN Mild Pain (1 - 3)  ketorolac   Injectable 30 milliGRAM(s) IV Push once PRN Moderate Pain (4 - 6)  lanolin Ointment 1 Application(s) Topical every 3 hours PRN Sore Nipples  metoclopramide Injectable 10 milliGRAM(s) IV Push every 6 hours PRN Postoperative Nausea and Vomiting  metoclopramide Injectable 10 milliGRAM(s) IV Push once PRN Nausea and/or Vomiting  nalbuphine Injectable 2.5 milliGRAM(s) IV Push every 6 hours PRN Pruritus  naloxone Injectable 0.1 milliGRAM(s) IV Push every 3 minutes PRN For ANY of the following changes in patient status:  A. RR LESS THAN 10 breaths per minute, B. Oxygen saturation LESS THAN 90%, C. Sedation score of 6  ondansetron Injectable 4 milliGRAM(s) IV Push every 6 hours PRN Nausea  oxyCODONE    5 mG/acetaminophen 325 mG 1 Tablet(s) Oral every 3 hours PRN Moderate Pain (4 - 6)  oxyCODONE    5 mG/acetaminophen 325 mG 2 Tablet(s) Oral every 6 hours PRN Severe Pain (7 - 10)  simethicone 80 milliGRAM(s) Chew every 4 hours PRN Gas

## 2019-01-15 NOTE — PROGRESS NOTE ADULT - ASSESSMENT
38y years old  status post repeat  at 38 1/7 wks gestation for previous Post-Operative Day #3 without complications.
38 years old.  s/p rCS at 38w1d gestation. Postop day # 1 without complications.    -Routine post partum care  -Pain meds prn  -Encouraged ambulation  -Encouraged breastfeeding  -Lovenox for DVT ppx  -Plan to discharge home on pod#3 if no acute events
38y years old  status post repeat  at 38 wks gestation for prior  Post-Operative Day #2 without complications.

## 2019-01-15 NOTE — PROGRESS NOTE ADULT - PROBLEM SELECTOR PLAN 1
Discharge home today.  Pelvic rest and no heavy lifting for 6 weeks.  Follow up in 2 weeks for incision check.  Advice to seek medical care in the event of fever, chills, nausea/vomiting, heavy vaginal bleeding, vaginal discharge, severe pain, symptoms of depression, problems with incision or any other concerning symptoms.
Routine post  care.  Encourage ambulation
1. pain management  2. maternal infant bonding  3. breastfeeding

## 2019-01-16 LAB — SURGICAL PATHOLOGY STUDY: SIGNIFICANT CHANGE UP

## 2019-10-06 NOTE — OB PROVIDER H&P - NS_EFFACEMANT_OBGYN_ALL_OB_NU
L ankle INTERPRETATION:  no acute fracture; + moderate soft tissue swelling noted; normal bony alignment. 0

## 2020-09-12 NOTE — OB RN INTRAOPERATIVE NOTE - NS_INDWELLINGURINARYCATHETERINSERTEDBY_OBGYN_ALL_OB_FT
Increased Glycohemoglobin: medication as prescribed, low carb diet/ exercise, repeat in 3 months DR Griffin

## 2022-07-25 ENCOUNTER — OFFICE (OUTPATIENT)
Dept: URBAN - METROPOLITAN AREA CLINIC 94 | Facility: CLINIC | Age: 42
Setting detail: OPHTHALMOLOGY
End: 2022-07-25
Payer: MEDICAID

## 2022-07-25 DIAGNOSIS — H16.223: ICD-10-CM

## 2022-07-25 DIAGNOSIS — H10.45: ICD-10-CM

## 2022-07-25 PROCEDURE — 92004 COMPRE OPH EXAM NEW PT 1/>: CPT | Performed by: PHYSICIAN ASSISTANT

## 2022-07-25 PROCEDURE — 83861 MICROFLUID ANALY TEARS: CPT | Performed by: PHYSICIAN ASSISTANT

## 2022-07-25 ASSESSMENT — REFRACTION_AUTOREFRACTION
OS_SPHERE: -0.50
OD_CYLINDER: -0.25
OD_SPHERE: -0.25
OD_AXIS: 122
OS_CYLINDER: -0.25
OS_AXIS: 039

## 2022-07-25 ASSESSMENT — AXIALLENGTH_DERIVED
OS_AL: 23.6981
OD_AL: 23.6003

## 2022-07-25 ASSESSMENT — KERATOMETRY
OD_AXISANGLE_DEGREES: 083
OS_AXISANGLE_DEGREES: 107
OS_K1POWER_DIOPTERS: 43.50
OS_K2POWER_DIOPTERS: 44.25
OD_K1POWER_DIOPTERS: 43.50
OD_K2POWER_DIOPTERS: 44.25

## 2022-07-25 ASSESSMENT — TONOMETRY: OS_IOP_MMHG: 10

## 2022-07-25 ASSESSMENT — CONFRONTATIONAL VISUAL FIELD TEST (CVF)
OD_FINDINGS: FULL
OS_FINDINGS: FULL

## 2022-07-25 ASSESSMENT — VISUAL ACUITY
OD_BCVA: 20/20-1
OS_BCVA: 20/20-1

## 2022-07-25 ASSESSMENT — SPHEQUIV_DERIVED
OD_SPHEQUIV: -0.375
OS_SPHEQUIV: -0.625

## 2022-07-25 ASSESSMENT — SUPERFICIAL PUNCTATE KERATITIS (SPK)
OS_SPK: 2+
OD_SPK: 2+

## 2023-02-23 ENCOUNTER — EMERGENCY (EMERGENCY)
Facility: HOSPITAL | Age: 43
LOS: 1 days | Discharge: HOME | End: 2023-02-23
Attending: STUDENT IN AN ORGANIZED HEALTH CARE EDUCATION/TRAINING PROGRAM
Payer: MEDICAID

## 2023-02-23 VITALS
HEIGHT: 63 IN | WEIGHT: 220.46 LBS | HEART RATE: 90 BPM | TEMPERATURE: 98 F | OXYGEN SATURATION: 100 % | SYSTOLIC BLOOD PRESSURE: 138 MMHG | RESPIRATION RATE: 19 BRPM | DIASTOLIC BLOOD PRESSURE: 90 MMHG

## 2023-02-23 VITALS
DIASTOLIC BLOOD PRESSURE: 73 MMHG | TEMPERATURE: 99 F | OXYGEN SATURATION: 100 % | HEART RATE: 88 BPM | RESPIRATION RATE: 18 BRPM | SYSTOLIC BLOOD PRESSURE: 127 MMHG

## 2023-02-23 DIAGNOSIS — N89.8 OTHER SPECIFIED NONINFLAMMATORY DISORDERS OF VAGINA: Chronic | ICD-10-CM

## 2023-02-23 PROBLEM — O14.93 UNSPECIFIED PRE-ECLAMPSIA, THIRD TRIMESTER: Chronic | Status: ACTIVE | Noted: 2019-01-12

## 2023-02-23 LAB
APPEARANCE UR: CLEAR — SIGNIFICANT CHANGE UP
BILIRUB UR-MCNC: NEGATIVE — SIGNIFICANT CHANGE UP
COLOR SPEC: YELLOW — SIGNIFICANT CHANGE UP
DIFF PNL FLD: NEGATIVE — SIGNIFICANT CHANGE UP
GLUCOSE UR QL: NEGATIVE MG/DL — SIGNIFICANT CHANGE UP
HCG UR QL: NEGATIVE — SIGNIFICANT CHANGE UP
KETONES UR-MCNC: NEGATIVE — SIGNIFICANT CHANGE UP
LEUKOCYTE ESTERASE UR-ACNC: NEGATIVE — SIGNIFICANT CHANGE UP
NITRITE UR-MCNC: NEGATIVE — SIGNIFICANT CHANGE UP
PH UR: 8 — SIGNIFICANT CHANGE UP (ref 5–8)
PROT UR-MCNC: NEGATIVE — SIGNIFICANT CHANGE UP
SP GR SPEC: 1.01 — SIGNIFICANT CHANGE UP (ref 1.01–1.02)
UROBILINOGEN FLD QL: NEGATIVE MG/DL — SIGNIFICANT CHANGE UP

## 2023-02-23 PROCEDURE — 81003 URINALYSIS AUTO W/O SCOPE: CPT

## 2023-02-23 PROCEDURE — 72074 X-RAY EXAM THORAC SPINE4/>VW: CPT | Mod: 26

## 2023-02-23 PROCEDURE — 99284 EMERGENCY DEPT VISIT MOD MDM: CPT

## 2023-02-23 PROCEDURE — 99283 EMERGENCY DEPT VISIT LOW MDM: CPT

## 2023-02-23 PROCEDURE — 96372 THER/PROPH/DIAG INJ SC/IM: CPT

## 2023-02-23 PROCEDURE — 72074 X-RAY EXAM THORAC SPINE4/>VW: CPT

## 2023-02-23 PROCEDURE — 81025 URINE PREGNANCY TEST: CPT

## 2023-02-23 RX ORDER — IBUPROFEN 200 MG
1 TABLET ORAL
Qty: 20 | Refills: 0
Start: 2023-02-23

## 2023-02-23 RX ORDER — KETOROLAC TROMETHAMINE 30 MG/ML
30 SYRINGE (ML) INJECTION ONCE
Refills: 0 | Status: DISCONTINUED | OUTPATIENT
Start: 2023-02-23 | End: 2023-02-23

## 2023-02-23 RX ORDER — METHOCARBAMOL 500 MG/1
1 TABLET, FILM COATED ORAL
Qty: 9 | Refills: 0
Start: 2023-02-23 | End: 2023-02-25

## 2023-02-23 RX ORDER — METHOCARBAMOL 500 MG/1
750 TABLET, FILM COATED ORAL ONCE
Refills: 0 | Status: COMPLETED | OUTPATIENT
Start: 2023-02-23 | End: 2023-02-23

## 2023-02-23 RX ORDER — LIDOCAINE 4 G/100G
1 CREAM TOPICAL
Qty: 10 | Refills: 0
Start: 2023-02-23 | End: 2023-03-04

## 2023-02-23 RX ORDER — LIDOCAINE 4 G/100G
1 CREAM TOPICAL ONCE
Refills: 0 | Status: COMPLETED | OUTPATIENT
Start: 2023-02-23 | End: 2023-02-23

## 2023-02-23 RX ADMIN — LIDOCAINE 1 PATCH: 4 CREAM TOPICAL at 10:06

## 2023-02-23 RX ADMIN — METHOCARBAMOL 750 MILLIGRAM(S): 500 TABLET, FILM COATED ORAL at 10:05

## 2023-02-23 RX ADMIN — Medication 30 MILLIGRAM(S): at 10:05

## 2023-02-23 NOTE — ED ADULT TRIAGE NOTE - IDEAL BODY WEIGHT(KG)
"Oz Issac Sheritamagalielizabeth  Chief Complaint   Patient presents with   • N/V     Pt complains of n/v/d since 1800hrs this evening. Pt denies using in otc for symptom control. Pt endorses vomitting appx 3 times since being in triage.    • Abdominal Pain     Pt complaining of bilateral LQP that started appx 1800hrs     Pt wheeled to triage with above complaint.     /83   Pulse 87   Temp 36.1 °C (97 °F) (Temporal)   Resp 16   Ht 1.88 m (6' 2\")   Wt 64.1 kg (141 lb 5 oz)   SpO2 97%   BMI 18.14 kg/m²     Pt informed of triage process and encouraged to notify staff of any changes or concerns. Pt verbalized understanding of instructions. Apologized for long wait time. Pt placed back in lobby.     " 52

## 2023-02-23 NOTE — ED ADULT TRIAGE NOTE - CHIEF COMPLAINT QUOTE
Pt arrives to ED c/o posterior neck pain and back pain since yesterday.  Pt denies trauma or injury.  Pain worse with movement.  Pt ambulatory into ED

## 2023-02-23 NOTE — ED ADULT NURSE NOTE - NSIMPLEMENTINTERV_GEN_ALL_ED
Implemented All Universal Safety Interventions:  [place] to call system. Call bell, personal items and telephone within reach. Instruct patient to call for assistance. Room bathroom lighting operational. Non-slip footwear when patient is off stretcher. Physically safe environment: no spills, clutter or unnecessary equipment. Stretcher in lowest position, wheels locked, appropriate side rails in place.

## 2023-02-23 NOTE — ED PROVIDER NOTE - OBJECTIVE STATEMENT
42-year-old female presents to ED complaining of upper to middle back pain x1 month.  Patient explained that she has had recurrent pain like this for last for the whole month but denies any trauma, falls or known injury to her back.  Patient admits that her pain moves from side to side up and down her back and now she has issue with movement of the neck and turning.  Patient denies any fever, chills, chest pain or shortness of breath.  Patient denies any significant past medical history admits to being on birth control pills only. Pt denies any other issues at this time. 42-year-old female presents to ED complaining of upper to middle back pain x1 month.  Patient explained that she has had recurrent pain like this for last for the whole month but denies any trauma, falls or known injury to her back.  Patient admits that her pain moves from side to side up and down her back and now she has issue with movement of the neck and turning.  Patient denies any fever, chills, chest pain or shortness of breath.  Patient denies any significant past medical history admits to being on birth control pills only. Pt denies any other issues at this time. No numbness/tingling/weakness, no bowel or bladder incontinence.

## 2023-02-23 NOTE — ED PROVIDER NOTE - PHYSICAL EXAMINATION
.Back :  No deformity, lesions or injury noted to back on inspection. Spine appears normal with normal  anatomical curves. No spinal point tenderness on palpation. + paraspinal muscle point tenderness. Tenderness noted on palpation of gluteal muscle noted on right side . Normal straight leg raise noted. Normal knee and ankle reflex noted. .Back :  No deformity, lesions or injury noted to back on inspection. Spine appears normal with normal  anatomical curves. +whole thoracic midline and + b/l upper thoracic paraspinal muscle tenderness and L trapezius TTP. Tenderness noted on palpation of gluteal muscle noted on right side . Normal straight leg raise noted. Normal knee and ankle reflex noted.

## 2023-02-23 NOTE — ED PROVIDER NOTE - PRO INTERPRETER NEED 2
[place] Creole Principal Discharge DX:	38 weeks gestation of pregnancy  Goal:	ADL  Assessment and plan of treatment:	continue with prenatal appointments

## 2023-02-23 NOTE — ED PROVIDER NOTE - PATIENT PORTAL LINK FT
You can access the FollowMyHealth Patient Portal offered by [facility] by registering at the following website: [email or web address]. By joining [facility]’s FollowMyHealth portal, you will also be able to view your health information using other applications (apps) compatible with our system.

## 2023-02-23 NOTE — ED PROVIDER NOTE - CLINICAL SUMMARY MEDICAL DECISION MAKING FREE TEXT BOX
42-year-old female presents to ED complaining of upper to middle back pain x1 month.  Patient explained that she has had recurrent pain like this for last for the whole month but denies any trauma, falls or known injury to her back.  Patient admits that her pain moves from side to side up and down her back and now she has issue with movement of the neck and turning. Examination positive for no point tenderness of cervical spinal palpation tenderness, tenderness (6 spinal muscle in cervical region.  Tenderness on palpation of thoracic spine tenderness on palpation paraspinal muscle in thoracic region no ecchymosis slight edema noticed to upper shoulder region and  rest of exam completely normal. Patient treated for pain with Toradol and muscle relaxant.  Thoracic spine x-ray ordered 42-year-old female presents to ED complaining of upper to middle back pain x1 month.  Patient explained that she has had recurrent pain like this for last for the whole month but denies any trauma, falls or known injury to her back.  Patient admits that her pain moves from side to side up and down her back and now she has issue with movement of the neck and turning. Examination positive for no point tenderness of cervical spinal palpation tenderness, tenderness (6 spinal muscle in cervical region.  Tenderness on palpation of thoracic spine tenderness on palpation paraspinal muscle in thoracic region no ecchymosis slight edema noticed to upper shoulder region and  rest of exam completely normal. no red flags for back pain. Patient treated for pain with Toradol and muscle relaxant.  Thoracic spine x-ray ordered    no obvious deformity on xray, patient feels improved after muscle relaxants (discussed no driving whil e on this as it can make her sleepy). will d/c with outpatient followup with pcp     Glen CRANE

## 2023-02-23 NOTE — ED ADULT NURSE NOTE - OBJECTIVE STATEMENT
Patient presents to ED c/o posterior neck pain since yesterday. Denies trauma or injury. Medicated as per orders.

## 2023-02-23 NOTE — ED PROVIDER NOTE - NSFOLLOWUPCLINICS_GEN_ALL_ED_FT
[id number] [place] [address]  Phone: [phone]  Fax:   Follow Up Time: 4-6 Days

## 2023-08-12 ENCOUNTER — OFFICE (OUTPATIENT)
Dept: URBAN - METROPOLITAN AREA CLINIC 94 | Facility: CLINIC | Age: 43
Setting detail: OPHTHALMOLOGY
End: 2023-08-12
Payer: MEDICAID

## 2023-08-12 DIAGNOSIS — H01.004: ICD-10-CM

## 2023-08-12 DIAGNOSIS — H01.001: ICD-10-CM

## 2023-08-12 DIAGNOSIS — H16.223: ICD-10-CM

## 2023-08-12 PROCEDURE — 99213 OFFICE O/P EST LOW 20 MIN: CPT | Performed by: PHYSICIAN ASSISTANT

## 2023-08-12 ASSESSMENT — REFRACTION_AUTOREFRACTION
OD_SPHERE: -0.50
OD_CYLINDER: 0.00
OS_SPHERE: -0.25
OS_AXIS: 043
OD_AXIS: 000
OS_CYLINDER: -0.50

## 2023-08-12 ASSESSMENT — KERATOMETRY
OD_K1POWER_DIOPTERS: 43.25
OD_K2POWER_DIOPTERS: 44.25
OS_K1POWER_DIOPTERS: 43.50
OS_K2POWER_DIOPTERS: 44.25
OS_AXISANGLE_DEGREES: 108
OD_AXISANGLE_DEGREES: 083

## 2023-08-12 ASSESSMENT — AXIALLENGTH_DERIVED
OD_AL: 23.6953
OS_AL: 23.6491

## 2023-08-12 ASSESSMENT — CONFRONTATIONAL VISUAL FIELD TEST (CVF)
OD_FINDINGS: FULL
OS_FINDINGS: FULL

## 2023-08-12 ASSESSMENT — VISUAL ACUITY
OD_BCVA: 20/20-1
OS_BCVA: 20/20-1

## 2023-08-12 ASSESSMENT — SUPERFICIAL PUNCTATE KERATITIS (SPK)
OD_SPK: 2+
OS_SPK: 2+

## 2023-08-12 ASSESSMENT — SPHEQUIV_DERIVED
OS_SPHEQUIV: -0.5
OD_SPHEQUIV: -0.5

## 2023-08-12 ASSESSMENT — LID EXAM ASSESSMENTS
OD_BLEPHARITIS: RLL RUL 2+
OS_BLEPHARITIS: LLL LUL 2+

## 2023-08-12 ASSESSMENT — TONOMETRY: OD_IOP_MMHG: 11

## 2023-09-01 ENCOUNTER — OFFICE (OUTPATIENT)
Dept: URBAN - METROPOLITAN AREA CLINIC 94 | Facility: CLINIC | Age: 43
Setting detail: OPHTHALMOLOGY
End: 2023-09-01
Payer: MEDICAID

## 2023-09-01 ENCOUNTER — RX ONLY (RX ONLY)
Age: 43
End: 2023-09-01

## 2023-09-01 DIAGNOSIS — H01.004: ICD-10-CM

## 2023-09-01 DIAGNOSIS — H01.001: ICD-10-CM

## 2023-09-01 DIAGNOSIS — H01.002: ICD-10-CM

## 2023-09-01 DIAGNOSIS — H01.005: ICD-10-CM

## 2023-09-01 DIAGNOSIS — H16.223: ICD-10-CM

## 2023-09-01 PROCEDURE — 99213 OFFICE O/P EST LOW 20 MIN: CPT | Performed by: PHYSICIAN ASSISTANT

## 2023-09-01 PROCEDURE — 83861 MICROFLUID ANALY TEARS: CPT | Performed by: PHYSICIAN ASSISTANT

## 2023-09-01 ASSESSMENT — SPHEQUIV_DERIVED
OD_SPHEQUIV: -0.625
OS_SPHEQUIV: -0.5

## 2023-09-01 ASSESSMENT — KERATOMETRY
OS_AXISANGLE_DEGREES: 112
OD_K1POWER_DIOPTERS: 43.00
OD_K2POWER_DIOPTERS: 44.25
OS_K2POWER_DIOPTERS: 44.25
OS_K1POWER_DIOPTERS: 43.50
OD_AXISANGLE_DEGREES: 085
METHOD_AUTO_MANUAL: AUTO

## 2023-09-01 ASSESSMENT — TONOMETRY
OD_IOP_MMHG: 12
OS_IOP_MMHG: 11

## 2023-09-01 ASSESSMENT — AXIALLENGTH_DERIVED
OS_AL: 23.6491
OD_AL: 23.7912

## 2023-09-01 ASSESSMENT — REFRACTION_AUTOREFRACTION
OD_AXIS: 109
OS_SPHERE: -0.25
OS_AXIS: 062
OD_CYLINDER: -0.25
OD_SPHERE: -0.50
OS_CYLINDER: -0.50

## 2023-09-01 ASSESSMENT — LID EXAM ASSESSMENTS
OD_BLEPHARITIS: RLL RUL T
OS_BLEPHARITIS: LLL LUL T

## 2023-09-01 ASSESSMENT — VISUAL ACUITY
OS_BCVA: 20/25+1
OD_BCVA: 20/20-1

## 2023-09-01 ASSESSMENT — CONFRONTATIONAL VISUAL FIELD TEST (CVF)
OS_FINDINGS: FULL
OD_FINDINGS: FULL

## 2023-09-01 ASSESSMENT — SUPERFICIAL PUNCTATE KERATITIS (SPK)
OD_SPK: 2+
OS_SPK: 2+

## 2023-10-14 ENCOUNTER — OFFICE (OUTPATIENT)
Dept: URBAN - METROPOLITAN AREA CLINIC 94 | Facility: CLINIC | Age: 43
Setting detail: OPHTHALMOLOGY
End: 2023-10-14
Payer: MEDICAID

## 2023-10-14 ENCOUNTER — RX ONLY (RX ONLY)
Age: 43
End: 2023-10-14

## 2023-10-14 DIAGNOSIS — H01.004: ICD-10-CM

## 2023-10-14 DIAGNOSIS — H01.005: ICD-10-CM

## 2023-10-14 DIAGNOSIS — H16.223: ICD-10-CM

## 2023-10-14 PROCEDURE — 92012 INTRM OPH EXAM EST PATIENT: CPT | Performed by: PHYSICIAN ASSISTANT

## 2023-10-14 ASSESSMENT — REFRACTION_AUTOREFRACTION
OS_SPHERE: -0.75
OD_CYLINDER: -0.50
OD_SPHERE: -0.25
OD_AXIS: 101
OS_CYLINDER: -0.50
OS_AXIS: 046

## 2023-10-14 ASSESSMENT — TONOMETRY
OS_IOP_MMHG: 15
OD_IOP_MMHG: 14

## 2023-10-14 ASSESSMENT — KERATOMETRY
OD_AXISANGLE_DEGREES: 085
OS_K2POWER_DIOPTERS: 44.25
OD_K2POWER_DIOPTERS: 44.25
OD_K1POWER_DIOPTERS: 43.00
OS_AXISANGLE_DEGREES: 112
OS_K1POWER_DIOPTERS: 43.50
METHOD_AUTO_MANUAL: AUTO

## 2023-10-14 ASSESSMENT — SPHEQUIV_DERIVED
OD_SPHEQUIV: -0.5
OS_SPHEQUIV: -1

## 2023-10-14 ASSESSMENT — CONFRONTATIONAL VISUAL FIELD TEST (CVF)
OD_FINDINGS: FULL
OS_FINDINGS: FULL

## 2023-10-14 ASSESSMENT — VISUAL ACUITY
OS_BCVA: 20/20-1
OD_BCVA: 20/30-1

## 2023-10-14 ASSESSMENT — LID EXAM ASSESSMENTS
OS_BLEPHARITIS: LLL LUL T
OD_BLEPHARITIS: RLL RUL T

## 2023-10-14 ASSESSMENT — SUPERFICIAL PUNCTATE KERATITIS (SPK)
OS_SPK: 2+
OD_SPK: 2+

## 2023-10-14 ASSESSMENT — AXIALLENGTH_DERIVED
OD_AL: 23.7417
OS_AL: 23.8465

## 2023-11-28 NOTE — OB PROVIDER H&P - NS_PREOPBLOODCONS_OBGYN_ALL_OB
Admission/Transfer to [address] [place] [address]    Admission to [address] [place] [address] on 11/27/23 at 8:24 PM [facility]  [facility]. Orientation to unit and plan of care discussed with pt. Call light and personal belongings within reach. Will continue plan of care.       [place] Risk: Yes  Code Status: Full Resuscitation   Items sent to Security: Yes    Vital signs on admission:   Visit Vitals  /62 (BP Location: LUE - Left upper extremity, Patient Position: Supine)   Pulse 63   Temp 98.1 °F (36.7 °C) (Oral)   Resp 18   Ht 5' 3\" (1.6 m)   Wt 53.6 kg (118 lb 2.7 oz)   SpO2 95%   BMI 20.93 kg/m²       No

## 2024-01-13 ENCOUNTER — OFFICE (OUTPATIENT)
Dept: URBAN - METROPOLITAN AREA CLINIC 94 | Facility: CLINIC | Age: 44
Setting detail: OPHTHALMOLOGY
End: 2024-01-13
Payer: MEDICAID

## 2024-01-13 DIAGNOSIS — H01.002: ICD-10-CM

## 2024-01-13 DIAGNOSIS — H01.001: ICD-10-CM

## 2024-01-13 DIAGNOSIS — H01.005: ICD-10-CM

## 2024-01-13 DIAGNOSIS — H01.004: ICD-10-CM

## 2024-01-13 DIAGNOSIS — H16.223: ICD-10-CM

## 2024-01-13 PROCEDURE — 92012 INTRM OPH EXAM EST PATIENT: CPT | Performed by: PHYSICIAN ASSISTANT

## 2024-01-13 PROCEDURE — 83861 MICROFLUID ANALY TEARS: CPT | Performed by: PHYSICIAN ASSISTANT

## 2024-01-13 ASSESSMENT — CONFRONTATIONAL VISUAL FIELD TEST (CVF)
OD_FINDINGS: FULL
OS_FINDINGS: FULL

## 2024-01-13 ASSESSMENT — LID EXAM ASSESSMENTS
OD_BLEPHARITIS: RLL RUL T
OS_BLEPHARITIS: LLL LUL T

## 2024-01-13 ASSESSMENT — SUPERFICIAL PUNCTATE KERATITIS (SPK)
OS_SPK: 2+
OD_SPK: 2+

## 2024-01-15 ASSESSMENT — SPHEQUIV_DERIVED
OS_SPHEQUIV: -0.75
OD_SPHEQUIV: -0.875

## 2024-01-15 ASSESSMENT — REFRACTION_AUTOREFRACTION
OD_SPHERE: -0.75
OS_AXIS: 057
OS_SPHERE: -0.50
OD_CYLINDER: -0.25
OS_CYLINDER: -0.50
OD_AXIS: 122

## 2024-05-11 ENCOUNTER — OFFICE (OUTPATIENT)
Dept: URBAN - METROPOLITAN AREA CLINIC 94 | Facility: CLINIC | Age: 44
Setting detail: OPHTHALMOLOGY
End: 2024-05-11
Payer: MEDICAID

## 2024-05-11 DIAGNOSIS — H01.001: ICD-10-CM

## 2024-05-11 DIAGNOSIS — H01.002: ICD-10-CM

## 2024-05-11 DIAGNOSIS — H01.004: ICD-10-CM

## 2024-05-11 DIAGNOSIS — H01.005: ICD-10-CM

## 2024-05-11 PROCEDURE — 92012 INTRM OPH EXAM EST PATIENT: CPT | Performed by: PHYSICIAN ASSISTANT

## 2024-05-11 ASSESSMENT — LID EXAM ASSESSMENTS
OD_BLEPHARITIS: RLL RUL T
OS_BLEPHARITIS: LLL LUL T

## 2024-05-11 ASSESSMENT — CONFRONTATIONAL VISUAL FIELD TEST (CVF)
OS_FINDINGS: FULL
OD_FINDINGS: FULL

## 2024-10-08 NOTE — ED PROVIDER NOTE - HIV OFFER
Joce Tolbert is a 62 y.o. female who is currently ordered Vancomycin IV with management by the Pharmacy Consult service.  Relevant clinical data and objective / subjective history reviewed.  Vancomycin Assessment:  Indication and Goal AUC/Trough: Bacteremia (goal -600, trough >10)  Clinical Status: stable  Micro:     Renal Function:  SCr: 1.35 mg/dL  CrCl: 61.4 mL/min  Renal replacement: Not on dialysis  Days of Therapy: 2  Current Dose: 750 mg IV Q24H  Vancomycin Plan:  New Dosing: changed to 1,250 mg IV Q24H  Estimated AUC: 504 mcg*hr/mL  Estimated Trough: 14.9 mcg/mL  Next Level: 10/9/24 at 0600  Renal Function Monitoring: Daily BMP and UOP  Pharmacy will continue to follow closely for s/sx of nephrotoxicity, infusion reactions and appropriateness of therapy.  BMP and CBC will be ordered per protocol. We will continue to follow the patient’s culture results and clinical progress daily.    Shanthi Mao, Pharmacist     Opt out

## 2025-04-12 ENCOUNTER — OFFICE (OUTPATIENT)
Dept: URBAN - METROPOLITAN AREA CLINIC 94 | Facility: CLINIC | Age: 45
Setting detail: OPHTHALMOLOGY
End: 2025-04-12
Payer: MEDICAID

## 2025-04-12 DIAGNOSIS — H16.223: ICD-10-CM

## 2025-04-12 DIAGNOSIS — H01.001: ICD-10-CM

## 2025-04-12 DIAGNOSIS — H01.002: ICD-10-CM

## 2025-04-12 DIAGNOSIS — H01.004: ICD-10-CM

## 2025-04-12 PROCEDURE — 92014 COMPRE OPH EXAM EST PT 1/>: CPT | Performed by: PHYSICIAN ASSISTANT

## 2025-04-12 ASSESSMENT — SUPERFICIAL PUNCTATE KERATITIS (SPK)
OS_SPK: 2+
OD_SPK: 2+

## 2025-04-12 ASSESSMENT — CONFRONTATIONAL VISUAL FIELD TEST (CVF)
OS_FINDINGS: FULL
OD_FINDINGS: FULL

## 2025-04-12 ASSESSMENT — TONOMETRY
OS_IOP_MMHG: 13
OD_IOP_MMHG: 13

## 2025-04-12 ASSESSMENT — LID EXAM ASSESSMENTS
OD_BLEPHARITIS: RLL RUL T
OS_BLEPHARITIS: LLL LUL T

## 2025-04-14 ASSESSMENT — KERATOMETRY
OD_K1POWER_DIOPTERS: 43.50
OS_AXISANGLE_DEGREES: 123
OD_AXISANGLE_DEGREES: 078
METHOD_AUTO_MANUAL: AUTO
OS_K1POWER_DIOPTERS: 43.50
OS_K2POWER_DIOPTERS: 44.25
OD_K2POWER_DIOPTERS: 44.50

## 2025-04-14 ASSESSMENT — VISUAL ACUITY
OD_BCVA: 20/40
OS_BCVA: 20/40

## 2025-04-14 ASSESSMENT — REFRACTION_AUTOREFRACTION
OS_SPHERE: -0.50
OS_CYLINDER: -0.75
OD_SPHERE: -0.75
OD_AXIS: 104
OS_AXIS: 059
OD_CYLINDER: -0.25

## 2025-05-17 ENCOUNTER — OFFICE (OUTPATIENT)
Dept: URBAN - METROPOLITAN AREA CLINIC 94 | Facility: CLINIC | Age: 45
Setting detail: OPHTHALMOLOGY
End: 2025-05-17
Payer: COMMERCIAL

## 2025-05-17 DIAGNOSIS — H01.004: ICD-10-CM

## 2025-05-17 DIAGNOSIS — H01.002: ICD-10-CM

## 2025-05-17 DIAGNOSIS — H16.223: ICD-10-CM

## 2025-05-17 DIAGNOSIS — H01.005: ICD-10-CM

## 2025-05-17 DIAGNOSIS — H01.001: ICD-10-CM

## 2025-05-17 PROBLEM — H52.13 MYOPIA; BOTH EYES: Status: ACTIVE | Noted: 2025-05-17

## 2025-05-17 PROCEDURE — 99213 OFFICE O/P EST LOW 20 MIN: CPT | Performed by: OPTOMETRIST

## 2025-05-17 ASSESSMENT — REFRACTION_MANIFEST
OD_CYLINDER: SPH
OD_VA1: 20/20
OS_CYLINDER: -0.25
OS_SPHERE: -0.75
OS_VA1: 20/20
OS_AXIS: 060
OD_SPHERE: -0.75

## 2025-05-17 ASSESSMENT — KERATOMETRY
OS_AXISANGLE_DEGREES: 125
METHOD_AUTO_MANUAL: AUTO
OD_AXISANGLE_DEGREES: 090
OS_K1POWER_DIOPTERS: 43.50
OS_K2POWER_DIOPTERS: 44.25
OD_K1POWER_DIOPTERS: 43.50
OD_K2POWER_DIOPTERS: 44.50

## 2025-05-17 ASSESSMENT — VISUAL ACUITY
OS_BCVA: 20/40
OD_BCVA: 20/30-1

## 2025-05-17 ASSESSMENT — REFRACTION_AUTOREFRACTION
OD_CYLINDER: 0.00
OD_AXIS: 000
OD_SPHERE: -0.75
OS_SPHERE: -0.50
OS_CYLINDER: -0.75
OS_AXIS: 058

## 2025-05-17 ASSESSMENT — LID EXAM ASSESSMENTS
OD_BLEPHARITIS: RLL RUL T
OS_BLEPHARITIS: LLL LUL T

## 2025-05-17 ASSESSMENT — TONOMETRY
OS_IOP_MMHG: 11
OD_IOP_MMHG: 12

## 2025-05-17 ASSESSMENT — CONFRONTATIONAL VISUAL FIELD TEST (CVF)
OS_FINDINGS: FULL
OD_FINDINGS: FULL

## 2025-05-17 ASSESSMENT — SUPERFICIAL PUNCTATE KERATITIS (SPK)
OS_SPK: 2+
OD_SPK: 2+